# Patient Record
Sex: FEMALE | Race: BLACK OR AFRICAN AMERICAN | NOT HISPANIC OR LATINO | URBAN - METROPOLITAN AREA
[De-identification: names, ages, dates, MRNs, and addresses within clinical notes are randomized per-mention and may not be internally consistent; named-entity substitution may affect disease eponyms.]

---

## 2020-03-05 ENCOUNTER — INPATIENT (INPATIENT)
Facility: HOSPITAL | Age: 50
LOS: 2 days | Discharge: HOME | End: 2020-03-08
Attending: INTERNAL MEDICINE | Admitting: INTERNAL MEDICINE
Payer: MEDICAID

## 2020-03-05 VITALS
DIASTOLIC BLOOD PRESSURE: 94 MMHG | WEIGHT: 160.06 LBS | RESPIRATION RATE: 17 BRPM | OXYGEN SATURATION: 100 % | TEMPERATURE: 98 F | SYSTOLIC BLOOD PRESSURE: 166 MMHG | HEART RATE: 108 BPM | HEIGHT: 65 IN

## 2020-03-05 LAB
ALBUMIN SERPL ELPH-MCNC: 3.4 G/DL — SIGNIFICANT CHANGE UP (ref 3.3–5)
ALP SERPL-CCNC: 92 U/L — SIGNIFICANT CHANGE UP (ref 40–120)
ALT FLD-CCNC: 23 U/L — SIGNIFICANT CHANGE UP (ref 12–78)
ANION GAP SERPL CALC-SCNC: 4 MMOL/L — LOW (ref 5–17)
APTT BLD: 34.6 SEC — SIGNIFICANT CHANGE UP (ref 27.5–36.3)
AST SERPL-CCNC: 24 U/L — SIGNIFICANT CHANGE UP (ref 15–37)
BASOPHILS # BLD AUTO: 0.04 K/UL — SIGNIFICANT CHANGE UP (ref 0–0.2)
BASOPHILS NFR BLD AUTO: 0.6 % — SIGNIFICANT CHANGE UP (ref 0–2)
BILIRUB SERPL-MCNC: 0.4 MG/DL — SIGNIFICANT CHANGE UP (ref 0.2–1.2)
BUN SERPL-MCNC: 6 MG/DL — LOW (ref 7–23)
CALCIUM SERPL-MCNC: 9.4 MG/DL — SIGNIFICANT CHANGE UP (ref 8.5–10.1)
CHLORIDE SERPL-SCNC: 103 MMOL/L — SIGNIFICANT CHANGE UP (ref 96–108)
CK MB BLD-MCNC: <0.9 % — SIGNIFICANT CHANGE UP (ref 0–3.5)
CK MB CFR SERPL CALC: <1 NG/ML — SIGNIFICANT CHANGE UP (ref 0.5–3.6)
CK SERPL-CCNC: 117 U/L — SIGNIFICANT CHANGE UP (ref 26–192)
CO2 SERPL-SCNC: 29 MMOL/L — SIGNIFICANT CHANGE UP (ref 22–31)
CREAT SERPL-MCNC: 0.57 MG/DL — SIGNIFICANT CHANGE UP (ref 0.5–1.3)
EOSINOPHIL # BLD AUTO: 0.16 K/UL — SIGNIFICANT CHANGE UP (ref 0–0.5)
EOSINOPHIL NFR BLD AUTO: 2.6 % — SIGNIFICANT CHANGE UP (ref 0–6)
GLUCOSE SERPL-MCNC: 93 MG/DL — SIGNIFICANT CHANGE UP (ref 70–99)
HCT VFR BLD CALC: 40.4 % — SIGNIFICANT CHANGE UP (ref 34.5–45)
HGB BLD-MCNC: 13.1 G/DL — SIGNIFICANT CHANGE UP (ref 11.5–15.5)
IMM GRANULOCYTES NFR BLD AUTO: 0.3 % — SIGNIFICANT CHANGE UP (ref 0–1.5)
INR BLD: 1.03 RATIO — SIGNIFICANT CHANGE UP (ref 0.88–1.16)
LYMPHOCYTES # BLD AUTO: 1.67 K/UL — SIGNIFICANT CHANGE UP (ref 1–3.3)
LYMPHOCYTES # BLD AUTO: 26.8 % — SIGNIFICANT CHANGE UP (ref 13–44)
MCHC RBC-ENTMCNC: 30.1 PG — SIGNIFICANT CHANGE UP (ref 27–34)
MCHC RBC-ENTMCNC: 32.4 GM/DL — SIGNIFICANT CHANGE UP (ref 32–36)
MCV RBC AUTO: 92.9 FL — SIGNIFICANT CHANGE UP (ref 80–100)
MONOCYTES # BLD AUTO: 0.33 K/UL — SIGNIFICANT CHANGE UP (ref 0–0.9)
MONOCYTES NFR BLD AUTO: 5.3 % — SIGNIFICANT CHANGE UP (ref 2–14)
NEUTROPHILS # BLD AUTO: 4 K/UL — SIGNIFICANT CHANGE UP (ref 1.8–7.4)
NEUTROPHILS NFR BLD AUTO: 64.4 % — SIGNIFICANT CHANGE UP (ref 43–77)
NRBC # BLD: 0 /100 WBCS — SIGNIFICANT CHANGE UP (ref 0–0)
PLATELET # BLD AUTO: 294 K/UL — SIGNIFICANT CHANGE UP (ref 150–400)
POTASSIUM SERPL-MCNC: 4.1 MMOL/L — SIGNIFICANT CHANGE UP (ref 3.5–5.3)
POTASSIUM SERPL-SCNC: 4.1 MMOL/L — SIGNIFICANT CHANGE UP (ref 3.5–5.3)
PROT SERPL-MCNC: 7.8 GM/DL — SIGNIFICANT CHANGE UP (ref 6–8.3)
PROTHROM AB SERPL-ACNC: 11.6 SEC — SIGNIFICANT CHANGE UP (ref 10–12.9)
RBC # BLD: 4.35 M/UL — SIGNIFICANT CHANGE UP (ref 3.8–5.2)
RBC # FLD: 12.4 % — SIGNIFICANT CHANGE UP (ref 10.3–14.5)
SODIUM SERPL-SCNC: 136 MMOL/L — SIGNIFICANT CHANGE UP (ref 135–145)
TROPONIN I SERPL-MCNC: <.015 NG/ML — SIGNIFICANT CHANGE UP (ref 0.01–0.04)
TSH SERPL-MCNC: 1.83 UIU/ML — SIGNIFICANT CHANGE UP (ref 0.36–3.74)
WBC # BLD: 6.22 K/UL — SIGNIFICANT CHANGE UP (ref 3.8–10.5)
WBC # FLD AUTO: 6.22 K/UL — SIGNIFICANT CHANGE UP (ref 3.8–10.5)

## 2020-03-05 PROCEDURE — 99285 EMERGENCY DEPT VISIT HI MDM: CPT

## 2020-03-05 PROCEDURE — 70450 CT HEAD/BRAIN W/O DYE: CPT | Mod: 26

## 2020-03-05 PROCEDURE — 93010 ELECTROCARDIOGRAM REPORT: CPT

## 2020-03-05 PROCEDURE — 99223 1ST HOSP IP/OBS HIGH 75: CPT

## 2020-03-05 PROCEDURE — 71045 X-RAY EXAM CHEST 1 VIEW: CPT | Mod: 26

## 2020-03-05 RX ORDER — AMLODIPINE BESYLATE 2.5 MG/1
1 TABLET ORAL
Qty: 0 | Refills: 0 | DISCHARGE

## 2020-03-05 RX ORDER — SIMVASTATIN 20 MG/1
10 TABLET, FILM COATED ORAL AT BEDTIME
Refills: 0 | Status: DISCONTINUED | OUTPATIENT
Start: 2020-03-05 | End: 2020-03-08

## 2020-03-05 RX ORDER — AMLODIPINE BESYLATE 2.5 MG/1
5 TABLET ORAL DAILY
Refills: 0 | Status: DISCONTINUED | OUTPATIENT
Start: 2020-03-05 | End: 2020-03-08

## 2020-03-05 RX ORDER — SIMVASTATIN 20 MG/1
1 TABLET, FILM COATED ORAL
Qty: 0 | Refills: 0 | DISCHARGE

## 2020-03-05 RX ADMIN — SIMVASTATIN 10 MILLIGRAM(S): 20 TABLET, FILM COATED ORAL at 22:54

## 2020-03-05 NOTE — ED PROVIDER NOTE - EKG ADDITIONAL INFORMATION FREE TEXT
sinus tachycardia, no ST elevation/depression noted T wave inversion on inferolateral leads III, V4/5/6.

## 2020-03-05 NOTE — H&P ADULT - NSHPPHYSICALEXAM_GEN_ALL_CORE
ICU Vital Signs Last 24 Hrs  T(C): 36.6 (05 Mar 2020 16:37), Max: 36.7 (05 Mar 2020 15:14)  T(F): 97.9 (05 Mar 2020 16:37), Max: 98 (05 Mar 2020 15:14)  HR: 101 (05 Mar 2020 16:37) (101 - 108)  BP: 146/84 (05 Mar 2020 16:37) (146/84 - 166/94)  BP(mean): 106 (05 Mar 2020 16:37) (106 - 106)  ABP: --  ABP(mean): --  RR: 15 (05 Mar 2020 16:37) (15 - 17)  SpO2: 100% (05 Mar 2020 16:37) (100% - 100%)  GENERAL: NAD well-developed  HEAD:  Atraumatic, Normocephalic  EYES: EOMI, PERRLA, conjunctiva and sclera clear  ENMT: No tonsillar erythema, exudates, or enlargement; Moist mucous membranes, Good dentition, No lesions  NECK: Supple, No JVD, Normal thyroid  NERVOUS SYSTEM:  Alert & Oriented X3, Good concentration; Motor Strength 5/5 B/L upper and lower extremities; DTRs 2+ intact and symmetric  CHEST/LUNG: Clear to percussion bilaterally; No rales, rhonchi, wheezing, or rubs  HEART: Regular rate and rhythm; No murmurs, rubs, or gallops  ABDOMEN: Soft, Nontender, Nondistended; Bowel sounds present  EXTREMITIES:  2+ Peripheral Pulses, No clubbing, cyanosis, or edema  LYMPH: No lymphadenopathy   SKIN: No rashes or lesions

## 2020-03-05 NOTE — ED ADULT TRIAGE NOTE - CHIEF COMPLAINT QUOTE
Visiting from [place] , reports Dizziness with nausea  and generalized weakness . denies fever , denies cp  or vomiting

## 2020-03-05 NOTE — ED ADULT NURSE NOTE - NSIMPLEMENTINTERV_GEN_ALL_ED
Implemented All Universal Safety Interventions:  [place] to call system. Call bell, personal items and telephone within reach. Instruct patient to call for assistance. Room bathroom lighting operational. Non-slip footwear when patient is off stretcher. Physically safe environment: no spills, clutter or unnecessary equipment. Stretcher in lowest position, wheels locked, appropriate side rails in place.

## 2020-03-05 NOTE — ED PROVIDER NOTE - OBJECTIVE STATEMENT
49 year old female with PMH of HTN, HLD presenting to ED due to dizziness x 10 days associated with nausea and feeling of nervousness. Pt states otherwise dizziness comes in bouts, especially with exertion trying to go up stairs.

## 2020-03-05 NOTE — ED ADULT NURSE NOTE - CHIEF COMPLAINT QUOTE
Visiting from [place] , reports Dizziness with nausea  and generalized weakness . denies fever , denies cp  or vomiting

## 2020-03-06 LAB
ANION GAP SERPL CALC-SCNC: 3 MMOL/L — LOW (ref 5–17)
BUN SERPL-MCNC: 8 MG/DL — SIGNIFICANT CHANGE UP (ref 7–23)
CALCIUM SERPL-MCNC: 9.4 MG/DL — SIGNIFICANT CHANGE UP (ref 8.5–10.1)
CHLORIDE SERPL-SCNC: 101 MMOL/L — SIGNIFICANT CHANGE UP (ref 96–108)
CO2 SERPL-SCNC: 32 MMOL/L — HIGH (ref 22–31)
CREAT SERPL-MCNC: 0.65 MG/DL — SIGNIFICANT CHANGE UP (ref 0.5–1.3)
GLUCOSE SERPL-MCNC: 99 MG/DL — SIGNIFICANT CHANGE UP (ref 70–99)
HCT VFR BLD CALC: 41.7 % — SIGNIFICANT CHANGE UP (ref 34.5–45)
HGB BLD-MCNC: 13.5 G/DL — SIGNIFICANT CHANGE UP (ref 11.5–15.5)
MCHC RBC-ENTMCNC: 30.3 PG — SIGNIFICANT CHANGE UP (ref 27–34)
MCHC RBC-ENTMCNC: 32.4 GM/DL — SIGNIFICANT CHANGE UP (ref 32–36)
MCV RBC AUTO: 93.7 FL — SIGNIFICANT CHANGE UP (ref 80–100)
NRBC # BLD: 0 /100 WBCS — SIGNIFICANT CHANGE UP (ref 0–0)
PLATELET # BLD AUTO: 330 K/UL — SIGNIFICANT CHANGE UP (ref 150–400)
POTASSIUM SERPL-MCNC: 3.7 MMOL/L — SIGNIFICANT CHANGE UP (ref 3.5–5.3)
POTASSIUM SERPL-SCNC: 3.7 MMOL/L — SIGNIFICANT CHANGE UP (ref 3.5–5.3)
RBC # BLD: 4.45 M/UL — SIGNIFICANT CHANGE UP (ref 3.8–5.2)
RBC # FLD: 12.3 % — SIGNIFICANT CHANGE UP (ref 10.3–14.5)
SODIUM SERPL-SCNC: 136 MMOL/L — SIGNIFICANT CHANGE UP (ref 135–145)
WBC # BLD: 5.81 K/UL — SIGNIFICANT CHANGE UP (ref 3.8–10.5)
WBC # FLD AUTO: 5.81 K/UL — SIGNIFICANT CHANGE UP (ref 3.8–10.5)

## 2020-03-06 PROCEDURE — 93306 TTE W/DOPPLER COMPLETE: CPT | Mod: 26

## 2020-03-06 PROCEDURE — 99223 1ST HOSP IP/OBS HIGH 75: CPT

## 2020-03-06 PROCEDURE — 93880 EXTRACRANIAL BILAT STUDY: CPT | Mod: 26

## 2020-03-06 PROCEDURE — 70551 MRI BRAIN STEM W/O DYE: CPT | Mod: 26

## 2020-03-06 PROCEDURE — 99233 SBSQ HOSP IP/OBS HIGH 50: CPT

## 2020-03-06 RX ORDER — INFLUENZA VIRUS VACCINE 15; 15; 15; 15 UG/.5ML; UG/.5ML; UG/.5ML; UG/.5ML
0.5 SUSPENSION INTRAMUSCULAR ONCE
Refills: 0 | Status: DISCONTINUED | OUTPATIENT
Start: 2020-03-06 | End: 2020-03-08

## 2020-03-06 RX ORDER — HEPARIN SODIUM 5000 [USP'U]/ML
5000 INJECTION INTRAVENOUS; SUBCUTANEOUS EVERY 12 HOURS
Refills: 0 | Status: DISCONTINUED | OUTPATIENT
Start: 2020-03-06 | End: 2020-03-08

## 2020-03-06 RX ADMIN — AMLODIPINE BESYLATE 5 MILLIGRAM(S): 2.5 TABLET ORAL at 06:14

## 2020-03-06 RX ADMIN — SIMVASTATIN 10 MILLIGRAM(S): 20 TABLET, FILM COATED ORAL at 21:29

## 2020-03-06 NOTE — PHYSICAL THERAPY INITIAL EVALUATION ADULT - GAIT TRAINING, PT EVAL
Pt will ambulate 150ft independently c straight cane and improved balance in 2weeks. Pt will negotiate 10 steps c straight cane and no rails in 2-5 days.

## 2020-03-06 NOTE — PHYSICAL THERAPY INITIAL EVALUATION ADULT - PERTINENT HX OF CURRENT PROBLEM, REHAB EVAL
Pt admitted 3/5/20 to ED for complaints of dizziness for the last 10 days c nausea and increased dizziness with exertion

## 2020-03-06 NOTE — PROGRESS NOTE ADULT - SUBJECTIVE AND OBJECTIVE BOX
Patient is a 49y old  Female who presents with a chief complaint of dizziness (06 Mar 2020 16:00)      INTERVAL HPI/OVERNIGHT EVENTS:  Pt was seen and examined, no acute events.    MEDICATIONS  (STANDING):  amLODIPine   Tablet 5 milliGRAM(s) Oral daily  influenza   Vaccine 0.5 milliLiter(s) IntraMuscular once  simvastatin 10 milliGRAM(s) Oral at bedtime    MEDICATIONS  (PRN):      Allergies  No Known Allergies      Vital Signs Last 24 Hrs  T(C): 36.8 (06 Mar 2020 11:28), Max: 37 (05 Mar 2020 19:40)  T(F): 98.2 (06 Mar 2020 11:28), Max: 98.6 (05 Mar 2020 19:40)  HR: 93 (06 Mar 2020 11:28) (80 - 106)  BP: 142/86 (06 Mar 2020 11:28) (110/75 - 154/91)  BP(mean): 106 (05 Mar 2020 16:37) (106 - 106)  RR: 16 (06 Mar 2020 11:28) (13 - 16)  SpO2: 98% (06 Mar 2020 11:28) (98% - 100%)      PHYSICAL EXAM:  GENERAL: NAD  HEAD:  Atraumatic  EYES: PERRLA  NERVOUS SYSTEM:  Awake, alert  CHEST/LUNG: Clear  HEART: RRR  ABDOMEN: Soft, non tender  EXTREMITIES:  no edema      LABS:                        13.5   5.81  )-----------( 330      ( 06 Mar 2020 07:35 )             41.7     03-06    136  |  101  |  8   ----------------------------<  99  3.7   |  32<H>  |  0.65    Ca    9.4      06 Mar 2020 07:35    TPro  7.8  /  Alb  3.4  /  TBili  0.4  /  DBili  x   /  AST  24  /  ALT  23  /  AlkPhos  92  03-05    PT/INR - ( 05 Mar 2020 16:52 )   PT: 11.6 sec;   INR: 1.03 ratio         PTT - ( 05 Mar 2020 16:52 )  PTT:34.6 sec    CAPILLARY BLOOD GLUCOSE          RADIOLOGY & ADDITIONAL TESTS:    Imaging Personally Reviewed:  [ ] YES  [ ] NO    Consultant(s) Notes Reviewed:  [ ] YES  [ ] NO    Care Discussed with Consultants/Other Providers [ ] YES  [ ] NO

## 2020-03-06 NOTE — PHYSICAL THERAPY INITIAL EVALUATION ADULT - TINETTI GAIT TEST, REHAB EVAL
Indication of gait -1/1,   Step Length and height -2/2,   Foot Clearance -2/2,   Step Symmetry - 1/1,  Step Continuity -0/1,   Path -1/ 2,   Trunk -1/2,   Walking Time -1/1,   Total Score - 9/12

## 2020-03-06 NOTE — CONSULT NOTE ADULT - SUBJECTIVE AND OBJECTIVE BOX
CHIEF COMPLAINT:  Patient is a 49y old  Female who presents with a chief complaint of dizziness (05 Mar 2020 17:54)      HPI:  49 year old female with HTN, HLD presenting to ED due to dizziness x 10 days associated with nausea and feeling of nervousness. Pt states otherwise dizziness comes in bouts, especially with exertion trying to go up stairs.    ALLERGIES:  No Known Allergies      Home Medications:  amLODIPine 5 mg oral tablet: 1 tab(s) orally once a day   (asomex -5) (05 Mar 2020 15:21)  simvastatin 10 mg oral tablet: 1 tab(s) orally once a day (at bedtime) (05 Mar 2020 15:21)    PAST MEDICAL & SURGICAL HISTORY:  HLD (hyperlipidemia)  HTN (hypertension)  No significant past surgical history      FAMILY HISTORY:  n/a    SOCIAL HISTORY:  no tobacco    REVIEW OF SYSTEMS:  General:  No wt loss, fevers, chills, night sweats  Eyes:  Good vision, no reported pain  ENT:  No sore throat, pain, runny nose, dysphagia  CV:  No pain, palpitations, hypo/hypertension  Resp:  No dyspnea, cough, tachypnea, wheezing  GI:  No pain, nausea, vomiting, diarrhea, constipation  :  No pain, bleeding, incontinence, nocturia  Muscle:  No pain, weakness  Breast:  No pain, abscess, mass, discharge  Neuro:  No weakness, tingling, memory problems  Psych:  No fatigue, insomnia, mood problems, depression  Endocrine:  No polyuria, polydipsia, cold/heat intolerance  Heme:  No petechiae, ecchymosis, easy bruisability  Skin:  No rash, tattoos, scars, edema    PHYSICAL EXAM:  Vital Signs:  Vital Signs Last 24 Hrs  T(C): 36.8 (06 Mar 2020 11:28), Max: 37 (05 Mar 2020 19:40)  T(F): 98.2 (06 Mar 2020 11:28), Max: 98.6 (05 Mar 2020 19:40)  HR: 93 (06 Mar 2020 11:28) (80 - 106)  BP: 142/86 (06 Mar 2020 11:28) (110/75 - 154/91)  BP(mean): 106 (05 Mar 2020 16:37) (106 - 106)  RR: 16 (06 Mar 2020 11:28) (13 - 16)  SpO2: 98% (06 Mar 2020 11:28) (98% - 100%)      Tele:     Constitutional: well developed, normal appearance, well groomed, well nourished, no deformities and no acute distress.   Eyes: the conjunctiva exhibited no abnormalities and the eyelids demonstrated no xanthelasmas.   HEENT: normal oral mucosa, no oral pallor and no oral cyanosis.   Neck: normal jugular venous A waves present, normal jugular venous V waves present and no jugular venous davis A waves.   Pulmonary: no respiratory distress, normal respiratory rhythm and effort, no accessory muscle use and lungs were clear to auscultation bilaterally.   Cardiovascular: heart rate and rhythm were normal, normal S1 and S2 and no murmur, gallop, rub, heave or thrill are present.   Abdomen: soft, non-tender, no hepato-splenomegaly and no abdominal mass palpated.   Musculoskeletal: the gait could not be assessed.  Extremities: no clubbing of the fingernails, no localized cyanosis, no petechial hemorrhages and no ischemic changes.   Skin: normal skin color and pigmentation, no rash, no venous stasis, no skin lesions, no skin ulcer and no xanthoma was observed.   Psychiatric: oriented to person, place, and time, the affect was normal, the mood was normal and not feeling anxious.      LABORATORY:                          13.5   5.81  )-----------( 330      ( 06 Mar 2020 07:35 )             41.7     03-06    136  |  101  |  8   ----------------------------<  99  3.7   |  32<H>  |  0.65    Ca    9.4      06 Mar 2020 07:35    TPro  7.8  /  Alb  3.4  /  TBili  0.4  /  DBili  x   /  AST  24  /  ALT  23  /  AlkPhos  92  03-05      CARDIAC MARKERS ( 05 Mar 2020 16:52 )  <.015 ng/mL / x     / 117 U/L / x     / <1.0 ng/mL      LIVER FUNCTIONS - ( 05 Mar 2020 16:52 )  Alb: 3.4 g/dL / Pro: 7.8 gm/dL / ALK PHOS: 92 U/L / ALT: 23 U/L / AST: 24 U/L / GGT: x           PT/INR - ( 05 Mar 2020 16:52 )   PT: 11.6 sec;   INR: 1.03 ratio         PTT - ( 05 Mar 2020 16:52 )  PTT:34.6 sec    IMAGING:  < from: US Duplex Carotid Arteries Complete, Bilateral (03.06.20 @ 15:46) >  IMPRESSION: No evidence of critical stenosis or occlusion,  right or left carotid system.    < end of copied text >    < from: MR Head No Cont (03.06.20 @ 15:29) >  Mild nonspecific white matter changes with a differential of migraines, chronic microvascular ischemic or less likely demyelinating or infectious process. Negative for mass or infarct.    < end of copied text >    ASSESSMENT:  49 year old female with HTN, HLD presenting to ED due to dizziness x 10 days associated with nausea and feeling of nervousness. Pt states otherwise dizziness comes in bouts, especially with exertion trying to go up stairs.    PLAN:     Tele for now.    echo prelim wnl no significant valvular disease.  Stay on:  amLODIPine   Tablet 5 milliGRAM(s) Oral daily  simvastatin 10 milliGRAM(s) Oral at bedtime      Srinivas Leo MD, FACC, TOM, YONATHAN, FACP  Director, Heart Failure Services  [facility]  , Department of Cardiology  [facility] [employer] [facility] CHIEF COMPLAINT:  Patient is a 49y old  Female who presents with a chief complaint of dizziness (05 Mar 2020 17:54)      HPI:  49 year old female with HTN, HLD presenting to ED due to dizziness x 10 days associated with nausea and feeling of nervousness. Pt states otherwise dizziness comes in bouts, especially with exertion trying to go up stairs.    ALLERGIES:  No Known Allergies      Home Medications:  amLODIPine 5 mg oral tablet: 1 tab(s) orally once a day   (asomex -5) (05 Mar 2020 15:21)  simvastatin 10 mg oral tablet: 1 tab(s) orally once a day (at bedtime) (05 Mar 2020 15:21)    PAST MEDICAL & SURGICAL HISTORY:  HLD (hyperlipidemia)  HTN (hypertension)  No significant past surgical history      FAMILY HISTORY:  n/a    SOCIAL HISTORY:  no tobacco    REVIEW OF SYSTEMS:  General:  No wt loss, fevers, chills, night sweats  Eyes:  Good vision, no reported pain  ENT:  No sore throat, pain, runny nose, dysphagia  CV:  No pain, palpitations, hypo/hypertension  Resp:  No dyspnea, cough, tachypnea, wheezing  GI:  No pain, nausea, vomiting, diarrhea, constipation  :  No pain, bleeding, incontinence, nocturia  Muscle:  No pain, weakness  Breast:  No pain, abscess, mass, discharge  Neuro:  No weakness, tingling, memory problems  Psych:  No fatigue, insomnia, mood problems, depression  Endocrine:  No polyuria, polydipsia, cold/heat intolerance  Heme:  No petechiae, ecchymosis, easy bruisability  Skin:  No rash, tattoos, scars, edema    PHYSICAL EXAM:  Vital Signs:  Vital Signs Last 24 Hrs  T(C): 36.8 (06 Mar 2020 11:28), Max: 37 (05 Mar 2020 19:40)  T(F): 98.2 (06 Mar 2020 11:28), Max: 98.6 (05 Mar 2020 19:40)  HR: 93 (06 Mar 2020 11:28) (80 - 106)  BP: 142/86 (06 Mar 2020 11:28) (110/75 - 154/91)  BP(mean): 106 (05 Mar 2020 16:37) (106 - 106)  RR: 16 (06 Mar 2020 11:28) (13 - 16)  SpO2: 98% (06 Mar 2020 11:28) (98% - 100%)      Tele: SR/SBrady    Constitutional: well developed, normal appearance, well groomed, well nourished, no deformities and no acute distress.   Eyes: the conjunctiva exhibited no abnormalities and the eyelids demonstrated no xanthelasmas.   HEENT: normal oral mucosa, no oral pallor and no oral cyanosis.   Neck: normal jugular venous A waves present, normal jugular venous V waves present and no jugular venous davis A waves.   Pulmonary: no respiratory distress, normal respiratory rhythm and effort, no accessory muscle use and lungs were clear to auscultation bilaterally.   Cardiovascular: heart rate and rhythm were normal, normal S1 and S2 and no murmur, gallop, rub, heave or thrill are present.   Abdomen: soft, non-tender, no hepato-splenomegaly and no abdominal mass palpated.   Musculoskeletal: the gait could not be assessed.  Extremities: no clubbing of the fingernails, no localized cyanosis, no petechial hemorrhages and no ischemic changes.   Skin: normal skin color and pigmentation, no rash, no venous stasis, no skin lesions, no skin ulcer and no xanthoma was observed.   Psychiatric: oriented to person, place, and time, the affect was normal, the mood was normal and not feeling anxious.      LABORATORY:                          13.5   5.81  )-----------( 330      ( 06 Mar 2020 07:35 )             41.7     03-06    136  |  101  |  8   ----------------------------<  99  3.7   |  32<H>  |  0.65    Ca    9.4      06 Mar 2020 07:35    TPro  7.8  /  Alb  3.4  /  TBili  0.4  /  DBili  x   /  AST  24  /  ALT  23  /  AlkPhos  92  03-05      CARDIAC MARKERS ( 05 Mar 2020 16:52 )  <.015 ng/mL / x     / 117 U/L / x     / <1.0 ng/mL      LIVER FUNCTIONS - ( 05 Mar 2020 16:52 )  Alb: 3.4 g/dL / Pro: 7.8 gm/dL / ALK PHOS: 92 U/L / ALT: 23 U/L / AST: 24 U/L / GGT: x           PT/INR - ( 05 Mar 2020 16:52 )   PT: 11.6 sec;   INR: 1.03 ratio         PTT - ( 05 Mar 2020 16:52 )  PTT:34.6 sec    IMAGING:  < from: US Duplex Carotid Arteries Complete, Bilateral (03.06.20 @ 15:46) >  IMPRESSION: No evidence of critical stenosis or occlusion,  right or left carotid system.    < end of copied text >    < from: MR Head No Cont (03.06.20 @ 15:29) >  Mild nonspecific white matter changes with a differential of migraines, chronic microvascular ischemic or less likely demyelinating or infectious process. Negative for mass or infarct.    < end of copied text >    ASSESSMENT:  49 year old female with HTN, HLD presenting to ED due to dizziness x 10 days associated with nausea and feeling of nervousness. Pt states otherwise dizziness comes in bouts, especially with exertion trying to go up stairs.    PLAN:     Tele for now to rule out dysrhythmia.    echo prelim wnl no significant valvular disease.    Stay on:  amLODIPine   Tablet 5 milliGRAM(s) Oral daily  simvastatin 10 milliGRAM(s) Oral at bedtime    check postural vitals.    Srinivas eLo MD, FACC, TOM, YONATHAN, FACP  Director, Heart Failure Services  [facility]  , Department of Cardiology  [facility]

## 2020-03-06 NOTE — PROGRESS NOTE ADULT - ASSESSMENT
49 year old female with PMH of HTN, HLD presenting to ED due to dizziness x 10 days associated with nausea and feeling of nervousness. Pt states otherwise dizziness comes in bouts, especially with exertion trying to go up stairs.    Dizziness:  - CT, MRI neg for acute CVA  - Neg carotid doppler  - Check orthostasis.  - Check Echo  - Continue tele  - PT eval    HTN:  - BP acceptable  - Continue amlodipine    HLD:  - Continue statin     DVT ppx

## 2020-03-06 NOTE — PHYSICAL THERAPY INITIAL EVALUATION ADULT - GENERAL OBSERVATIONS, REHAB EVAL
Pt encountered supine in bed, cardiac monitor on tray table, A&Ox4, denies pain or discomfort, NAD and comfortable.

## 2020-03-06 NOTE — PHYSICAL THERAPY INITIAL EVALUATION ADULT - ADDITIONAL COMMENTS
As per pt, pt is visiting from [place] until Sunday and has been staying with nephew and nephew's fiance in a private house c 10 steps to enter home without rails.  Pt was independent with all ADLs and without an assistive device prior to admission. Pt states she stays on the main floor of the home c regular height toilet seat and walk-in shower.

## 2020-03-06 NOTE — PHYSICAL THERAPY INITIAL EVALUATION ADULT - TINETTI BALANCE TEST, REHAB EVAL
Sitting Balance - 1/1 , Rises From Chair -1 /2, Attempts to rise -2 /2 , Immediate Standing Balance - 1/2,  Standing Balance -1 /2, Nudged -  1/2, Eyes Closed - 1/1,  Turning 360 Deg -  2/2, Sitting down -1 /2, Balance Score -11 /16

## 2020-03-07 PROCEDURE — 99233 SBSQ HOSP IP/OBS HIGH 50: CPT

## 2020-03-07 RX ORDER — MECLIZINE HCL 12.5 MG
25 TABLET ORAL
Refills: 0 | Status: DISCONTINUED | OUTPATIENT
Start: 2020-03-07 | End: 2020-03-08

## 2020-03-07 RX ADMIN — SIMVASTATIN 10 MILLIGRAM(S): 20 TABLET, FILM COATED ORAL at 22:02

## 2020-03-07 RX ADMIN — HEPARIN SODIUM 5000 UNIT(S): 5000 INJECTION INTRAVENOUS; SUBCUTANEOUS at 05:13

## 2020-03-07 RX ADMIN — HEPARIN SODIUM 5000 UNIT(S): 5000 INJECTION INTRAVENOUS; SUBCUTANEOUS at 17:35

## 2020-03-07 RX ADMIN — AMLODIPINE BESYLATE 5 MILLIGRAM(S): 2.5 TABLET ORAL at 05:13

## 2020-03-07 NOTE — PROGRESS NOTE ADULT - SUBJECTIVE AND OBJECTIVE BOX
Patient is a 49y old  Female who presents with a chief complaint of dizziness (06 Mar 2020 16:14)      INTERVAL HPI/OVERNIGHT EVENTS:  Pt was seen and examined, no acute events.    MEDICATIONS  (STANDING):  amLODIPine   Tablet 5 milliGRAM(s) Oral daily  heparin  Injectable 5000 Unit(s) SubCutaneous every 12 hours  influenza   Vaccine 0.5 milliLiter(s) IntraMuscular once  simvastatin 10 milliGRAM(s) Oral at bedtime    MEDICATIONS  (PRN):      Allergies  No Known Allergies        Vital Signs Last 24 Hrs  T(C): 36.6 (07 Mar 2020 12:26), Max: 36.6 (07 Mar 2020 12:26)  T(F): 97.8 (07 Mar 2020 12:26), Max: 97.8 (07 Mar 2020 12:26)  HR: 94 (07 Mar 2020 12:26) (82 - 99)  BP: 113/70 (07 Mar 2020 12:26) (113/70 - 126/71)  BP(mean): --  RR: 16 (07 Mar 2020 12:26) (16 - 18)  SpO2: 100% (07 Mar 2020 12:26) (98% - 100%)      PHYSICAL EXAM:  GENERAL: NAD  HEAD:  Atraumatic  EYES: PERRLA  NERVOUS SYSTEM:  Awake, alert  CHEST/LUNG: Clear  HEART: RRR  ABDOMEN: Soft, non tender  EXTREMITIES:  no edema      LABS:                        13.5   5.81  )-----------( 330      ( 06 Mar 2020 07:35 )             41.7     03-06    136  |  101  |  8   ----------------------------<  99  3.7   |  32<H>  |  0.65    Ca    9.4      06 Mar 2020 07:35    TPro  7.8  /  Alb  3.4  /  TBili  0.4  /  DBili  x   /  AST  24  /  ALT  23  /  AlkPhos  92  03-05    PT/INR - ( 05 Mar 2020 16:52 )   PT: 11.6 sec;   INR: 1.03 ratio         PTT - ( 05 Mar 2020 16:52 )  PTT:34.6 sec    CAPILLARY BLOOD GLUCOSE          RADIOLOGY & ADDITIONAL TESTS:    Imaging Personally Reviewed:  [ ] YES  [ ] NO    Consultant(s) Notes Reviewed:  [ ] YES  [ ] NO    Care Discussed with Consultants/Other Providers [ ] YES  [ ] NO

## 2020-03-07 NOTE — PROGRESS NOTE ADULT - ASSESSMENT
49 year old female with PMH of HTN, HLD presenting to ED due to dizziness x 10 days associated with nausea and feeling of nervousness. Pt states otherwise dizziness comes in bouts, especially with exertion trying to go up stairs.    Dizziness:  - Continues to be dizzy, she states initially she had vertigo now only light-headedness.   - CT, MRI neg for acute CVA  - Neg carotid doppler  - Neg orthostasis.  - Echo report pending  - Trail of meclizine  - PT eval home PT, pt uninsured.     HTN:  - BP acceptable  - Continue amlodipine    HLD:  - Continue statin     DVT ppx

## 2020-03-08 VITALS
DIASTOLIC BLOOD PRESSURE: 80 MMHG | SYSTOLIC BLOOD PRESSURE: 115 MMHG | RESPIRATION RATE: 16 BRPM | OXYGEN SATURATION: 99 % | HEART RATE: 86 BPM | TEMPERATURE: 98 F

## 2020-03-08 PROCEDURE — 99239 HOSP IP/OBS DSCHRG MGMT >30: CPT

## 2020-03-08 RX ADMIN — HEPARIN SODIUM 5000 UNIT(S): 5000 INJECTION INTRAVENOUS; SUBCUTANEOUS at 06:00

## 2020-03-08 RX ADMIN — AMLODIPINE BESYLATE 5 MILLIGRAM(S): 2.5 TABLET ORAL at 06:00

## 2020-03-08 NOTE — DISCHARGE NOTE PROVIDER - NSDCMRMEDTOKEN_GEN_ALL_CORE_FT
amLODIPine 5 mg oral tablet: 1 tab(s) orally once a day   (asomex -5)  simvastatin 10 mg oral tablet: 1 tab(s) orally once a day (at bedtime)

## 2020-03-08 NOTE — CHART NOTE - NSCHARTNOTEFT_GEN_A_CORE
[facility]       To Whom It May Concern,   Ms. Sumeet Lee was admitted on 3/5/20, treated and discharged on 3/8/20 from [facility]. Pt stable to travel.  If any questions or concerns please call.                   Thank you.  Perlita Stein MD  Hospitalist   Department of Medicine  [facility]

## 2020-03-08 NOTE — DISCHARGE NOTE PROVIDER - NSDCCPCAREPLAN_GEN_ALL_CORE_FT
PRINCIPAL DISCHARGE DIAGNOSIS  Diagnosis: Pre-syncope  Assessment and Plan of Treatment: - Improved  - CT, MRI neg for acute CVA  - Neg carotid doppler  - Neg orthostasis.  - Echo report pending  - Trail of meclizine  - PT eval home PT, pt uninsured. will return to [place] tomorrow.

## 2020-03-08 NOTE — DISCHARGE NOTE PROVIDER - HOSPITAL COURSE
49 year old female with PMH of HTN, HLD presenting to ED due to dizziness x 10 days associated with nausea and feeling of nervousness. Pt states otherwise dizziness comes in bouts, especially with exertion trying to go up stairs.        Dizziness:    - Improved    - CT, MRI neg for acute CVA    - Neg carotid doppler    - Neg orthostasis.    - Echo report pending    - Trail of meclizine    - PT eval home PT, pt uninsured. will return to [place] tomorrow.        HTN:    - BP acceptable    - Continue amlodipine        HLD:    - Continue statin         DVT ppx

## 2020-03-08 NOTE — DISCHARGE NOTE NURSING/CASE MANAGEMENT/SOCIAL WORK - PATIENT PORTAL LINK FT
You can access the FollowMyHealth Patient Portal offered by [facility] by registering at the following website: [email or web address]. By joining [facility]’s FollowMyHealth portal, you will also be able to view your health information using other applications (apps) compatible with our system.

## 2020-03-13 DIAGNOSIS — R11.0 NAUSEA: ICD-10-CM

## 2020-03-13 DIAGNOSIS — R55 SYNCOPE AND COLLAPSE: ICD-10-CM

## 2020-03-13 DIAGNOSIS — R00.0 TACHYCARDIA, UNSPECIFIED: ICD-10-CM

## 2020-03-13 DIAGNOSIS — E78.5 HYPERLIPIDEMIA, UNSPECIFIED: ICD-10-CM

## 2020-03-13 DIAGNOSIS — R42 DIZZINESS AND GIDDINESS: ICD-10-CM

## 2020-03-13 DIAGNOSIS — I10 ESSENTIAL (PRIMARY) HYPERTENSION: ICD-10-CM

## 2023-05-18 NOTE — PHYSICAL THERAPY INITIAL EVALUATION ADULT - PHYSICAL ASSIST/NONPHYSICAL ASSIST: STAIR NEGOTIATION, REHAB EVAL
Progress Note:         The patient was referred to the DIABETES PREVENTION PROGRAM through [facility], they will follow up with her outpatient.     nonverbal cues (demo/gestures)/1 person assist/verbal cues

## 2025-01-24 NOTE — ED ADULT NURSE NOTE - NSFALLRSKUNASSIST_ED_ALL_ED
[FreeTextEntry1] : Kitty is a 69-year-old female here for follow up.  I last saw her in 9/24.  Her past medical history is notable for cervical fusion, appendectomy, cataract extraction, pacemaker implantation for bradycardia, orthostatic hypotension, hyperlipidemia, reflux, anemia, hypothyroidism, and vertigo.  She is taking a baby aspirin, fludrocortisone 0.1, and was on midodrine 5 twice daily.  She has a history of memory difficulties over the last few years, and the tendency to fall.  In March of this year, she fell down a flight of steps.  She was evaluated at [facility] and there was suggestion/suspicion of a right vertebral artery dissection.  She has followed up with a neurosurgeon, and neurologist.  She she is being evaluated for normal pressure hydrocephalus, and they are ruling out cervical myelopathy, given her history of a cervical fusion. She had an echocardiogram in March in 2023 at [facility] which demonstrated a normal LV, with an EF of 60 to 65%, mild to moderate mitral regurgitation, mild to moderate tricuspid regurgitation and a pulmonary pressure of 50. She is a former smoker.  She had COVID in August of 2023 and presented with a worsening cough and dyspnea on October 25, 2023.  CTPA was positive for a right upper lobe/left lower lobe subsegmental PE.  She was treated with anticoagulation.  An echocardiogram was unremarkable.  She initially had a fever, which was presumably viral in origin.  She was discharged home on aspirin 81, Eliquis, Florinef 0.1 and midodrine 5 3 times daily.  She had a fall in the shower in late March 2024. She broke some ribs. She was found to be significantly orthostatic. Her PPM showed no events. She was admitted for 3 days and eventually discharged to rehab. Her midodrine had been increased to 10 tid, and florinef 01. continued. She is off Eliquis.  In 8/24, she was noted to be more confused with a facial droop. She was sent to the ER and eventually diagnosed with a Evolving subacute right MCA territorial infarct with petechial hemorrhages on MRI. Interrogation of her ppm showed no AF. Echocardiogram with normal LV function.  I last saw her in September 2024.  She was admitted with a fall in January 2024, and possible syncope.  Her symptoms were to be believed from orthostatic hypotension.  Her MRI showed no acute or new CVA and her pacemaker suggested no arrhythmia.  Her echo noted normal LV function, though confirmed the presence of a PFO  She continues to have some dizziness with positional change, though has not passed out. She has no chest pain, difficulty breathing, or palpitations.  She reports fatigue, and that everything is a struggle. 
no